# Patient Record
Sex: FEMALE | Race: WHITE
[De-identification: names, ages, dates, MRNs, and addresses within clinical notes are randomized per-mention and may not be internally consistent; named-entity substitution may affect disease eponyms.]

---

## 2019-12-02 ENCOUNTER — HOSPITAL ENCOUNTER (EMERGENCY)
Dept: HOSPITAL 61 - ER | Age: 84
Discharge: HOME | End: 2019-12-02
Payer: MEDICARE

## 2019-12-02 VITALS — SYSTOLIC BLOOD PRESSURE: 170 MMHG | DIASTOLIC BLOOD PRESSURE: 78 MMHG

## 2019-12-02 VITALS — BODY MASS INDEX: 24.8 KG/M2 | HEIGHT: 63 IN | WEIGHT: 140 LBS

## 2019-12-02 DIAGNOSIS — Z88.1: ICD-10-CM
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
## 2019-12-02 DIAGNOSIS — I10: ICD-10-CM

## 2019-12-02 DIAGNOSIS — Z88.0: ICD-10-CM

## 2019-12-02 DIAGNOSIS — F41.9: ICD-10-CM
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
## 2019-12-02 DIAGNOSIS — Z88.5: ICD-10-CM
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
## 2019-12-02 DIAGNOSIS — Z88.6: ICD-10-CM

## 2019-12-02 DIAGNOSIS — R51: ICD-10-CM

## 2019-12-02 DIAGNOSIS — Z71.1: ICD-10-CM
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
## 2019-12-02 DIAGNOSIS — Z86.73: ICD-10-CM

## 2019-12-02 DIAGNOSIS — Z88.8: ICD-10-CM

## 2019-12-02 DIAGNOSIS — S81.802A: Primary | ICD-10-CM

## 2019-12-02 DIAGNOSIS — S81.801A: ICD-10-CM

## 2019-12-02 DIAGNOSIS — Z88.2: ICD-10-CM

## 2019-12-02 LAB
ALBUMIN SERPL-MCNC: 3.9 G/DL (ref 3.4–5)
ALBUMIN/GLOB SERPL: 1.1 {RATIO} (ref 1–1.7)
ALP SERPL-CCNC: 144 U/L (ref 46–116)
ALT SERPL-CCNC: 20 U/L (ref 14–59)
ANION GAP SERPL CALC-SCNC: 10 MMOL/L (ref 6–14)
APTT PPP: YELLOW S
AST SERPL-CCNC: 14 U/L (ref 15–37)
BACTERIA #/AREA URNS HPF: 0 /HPF
BASOPHILS # BLD AUTO: 0 X10^3/UL (ref 0–0.2)
BASOPHILS NFR BLD: 1 % (ref 0–3)
BILIRUB SERPL-MCNC: 0.3 MG/DL (ref 0.2–1)
BILIRUB UR QL STRIP: NEGATIVE
BUN SERPL-MCNC: 20 MG/DL (ref 7–20)
BUN/CREAT SERPL: 29 (ref 6–20)
CALCIUM SERPL-MCNC: 9.8 MG/DL (ref 8.5–10.1)
CHLORIDE SERPL-SCNC: 104 MMOL/L (ref 98–107)
CK SERPL-CCNC: 85 U/L (ref 26–192)
CO2 SERPL-SCNC: 29 MMOL/L (ref 21–32)
CREAT SERPL-MCNC: 0.7 MG/DL (ref 0.6–1)
EOSINOPHIL NFR BLD: 0.2 X10^3/UL (ref 0–0.7)
EOSINOPHIL NFR BLD: 4 % (ref 0–3)
ERYTHROCYTE [DISTWIDTH] IN BLOOD BY AUTOMATED COUNT: 14.6 % (ref 11.5–14.5)
FIBRINOGEN PPP-MCNC: CLEAR MG/DL
GFR SERPLBLD BASED ON 1.73 SQ M-ARVRAT: 79.9 ML/MIN
GLOBULIN SER-MCNC: 3.6 G/DL (ref 2.2–3.8)
GLUCOSE SERPL-MCNC: 114 MG/DL (ref 70–99)
HCT VFR BLD CALC: 40.2 % (ref 36–47)
HGB BLD-MCNC: 13.5 G/DL (ref 12–15.5)
LYMPHOCYTES # BLD: 1 X10^3/UL (ref 1–4.8)
LYMPHOCYTES NFR BLD AUTO: 18 % (ref 24–48)
MAGNESIUM SERPL-MCNC: 2.3 MG/DL (ref 1.8–2.4)
MCH RBC QN AUTO: 30 PG (ref 25–35)
MCHC RBC AUTO-ENTMCNC: 34 G/DL (ref 31–37)
MCV RBC AUTO: 88 FL (ref 79–100)
MONO #: 0.2 X10^3/UL (ref 0–1.1)
MONOCYTES NFR BLD: 5 % (ref 0–9)
NEUT #: 3.9 X10^3/UL (ref 1.8–7.7)
NEUTROPHILS NFR BLD AUTO: 73 % (ref 31–73)
NITRITE UR QL STRIP: NEGATIVE
PH UR STRIP: 7 [PH]
PLATELET # BLD AUTO: 157 X10^3/UL (ref 140–400)
POTASSIUM SERPL-SCNC: 3.9 MMOL/L (ref 3.5–5.1)
PROT SERPL-MCNC: 7.5 G/DL (ref 6.4–8.2)
PROT UR STRIP-MCNC: NEGATIVE MG/DL
PROTHROMBIN TIME: 13.1 SEC (ref 11.7–14)
RBC # BLD AUTO: 4.56 X10^6/UL (ref 3.5–5.4)
RBC #/AREA URNS HPF: (no result) /HPF (ref 0–2)
SODIUM SERPL-SCNC: 143 MMOL/L (ref 136–145)
SQUAMOUS #/AREA URNS LPF: (no result) /LPF
UROBILINOGEN UR-MCNC: 0.2 MG/DL
WBC # BLD AUTO: 5.4 X10^3/UL (ref 4–11)
WBC #/AREA URNS HPF: (no result) /HPF (ref 0–4)

## 2019-12-02 PROCEDURE — 81001 URINALYSIS AUTO W/SCOPE: CPT

## 2019-12-02 PROCEDURE — 71045 X-RAY EXAM CHEST 1 VIEW: CPT

## 2019-12-02 PROCEDURE — 83735 ASSAY OF MAGNESIUM: CPT

## 2019-12-02 PROCEDURE — 70450 CT HEAD/BRAIN W/O DYE: CPT

## 2019-12-02 PROCEDURE — 84484 ASSAY OF TROPONIN QUANT: CPT

## 2019-12-02 PROCEDURE — 93005 ELECTROCARDIOGRAM TRACING: CPT

## 2019-12-02 PROCEDURE — 83880 ASSAY OF NATRIURETIC PEPTIDE: CPT

## 2019-12-02 PROCEDURE — 85610 PROTHROMBIN TIME: CPT

## 2019-12-02 PROCEDURE — 85025 COMPLETE CBC W/AUTO DIFF WBC: CPT

## 2019-12-02 PROCEDURE — 36415 COLL VENOUS BLD VENIPUNCTURE: CPT

## 2019-12-02 PROCEDURE — 82550 ASSAY OF CK (CPK): CPT

## 2019-12-02 PROCEDURE — 80053 COMPREHEN METABOLIC PANEL: CPT

## 2019-12-02 PROCEDURE — 83605 ASSAY OF LACTIC ACID: CPT

## 2019-12-02 NOTE — RAD
CT head without contrast dated 12/2/2019.

 

Comparison made to 9/14/2009.

 

Clinical data indication: Lower extremity weakness.

 

TECHNIQUE: Per contiguous axial imaging the head was performed from skull 

base to vertex. No contrast administered.

 

FINDINGS:

 

Ventricles and sulci are moderately prominent for age. No midline shift or

mass effect. Moderate patchy low density throughout the deep/subcortical 

periventricular white matter. No hemorrhage or extra-axial collection. 

Posterior fossa and brainstem unremarkable.

 

Mild mucosal thickening of the ethmoid air cells. The visualized paranasal

sinuses and mastoid air cells are otherwise clear. No apparent calvarial 

abnormality.

1. No evidence of acute intracranial hemorrhage or mass.

2. Mild/moderate chronic small vessel ischemic changes and atrophy, 

similar to slightly increased from prior exam.

 

Electronically signed by: Boston Thomas MD (12/2/2019 1:11 PM) 

[facility]

## 2019-12-02 NOTE — EKG
[facility]

              [address] [place] [address]

Test Date:    2019               Test Time:    14:14:26

Pat Name:     MONIQUE MARRERO                Department:   

Patient ID:   [id number]           Room:          

Gender:       F                        Technician:   

:          1935               Requested By: STEPHANIA MORIN

Order Number: [id number]           Reading MD:     

                                 Measurements

Intervals                              Axis          

Rate:         59                       P:            

CT:                                    QRS:          -152

QRSD:         82                       T:            174

QT:           416                                    

QTc:          416                                    

                           Interpretive Statements

ATRIAL FIBRILLATION

* POSSIBLE REVERSAL OF THE ARM LEADS

ABNORMAL RIGHT SUPERIOR AXIS DEVIATION

QRS(T) CONTOUR ABNORMALITY

CONSISTENT WITH ANTEROSEPTAL INFARCT

PROBABLY OLD

CONSISTENT WITH HIGH LATERAL INFARCT

AGE UNDETERMINED

CONSISTENT WITH INFERIOR INFARCT

AGE UNDETERMINED

ABNORMAL ECG

No previous ECG available for comparison

## 2019-12-02 NOTE — PHYS DOC
Past Medical History


Past Medical History:  CVA, Hypertension, Other


Additional Past Medical Histor:  STROKE IN 2009


Past Surgical History:  No Surgical History


Alcohol Use:  None


Drug Use:  None





Adult General


Chief Complaint


Chief Complaint:  WEAKNESS/GENERALIZED





HPI


HPI





Patient is a 84  year old female patient with history of hypertension, CVA in 

2009, chronic right lower extremity wound who presents with complaining of 

bilateral lower legs weakness and urinary frequency. Patient states she has had 

bilateral lower leg weakness since she tried to walk without paresthesia, 

headache, chest pain, fever and chills, blurred vision, shortness of breath. 

Patient states he had stroke in  and wanted to make sure she doesn't have 

another stroke. Patient also states she was prescribed doxycycline in  and 

fifth dry mouth and drink plenty of water and therefore is urinating frequently 

without dysuria, abdominal pain, nausea and vomiting, diarrhea and constipation 

and wants to make sure she doesn't have UTI.





Review of Systems


Review of Systems





Constitutional: Denies fever or chills []


Eyes: Denies change in visual acuity, redness, or eye pain []


HENT: Denies nasal congestion or sore throat []


Respiratory: Denies cough or shortness of breath []


Cardiovascular: No additional information not addressed in HPI []


GI: Denies abdominal pain, nausea, vomiting, bloody stools or diarrhea []


: Denies dysuria or hematuria []


Musculoskeletal: Denies back pain or joint pain []


Integument: Denies rash or skin lesions []


Neurologic: Denies headache, focal weakness or sensory changes []


Endocrine: Denies polyuria or polydipsia []





All other systems were reviewed and found to be within normal limits, except as 

documented in this note.





Allergies


Allergies





Allergies








Coded Allergies Type Severity Reaction Last Updated Verified


 


  acetaminophen Allergy Intermediate Unknown 7/22/15 No


 


  atropine Allergy Intermediate  7/25/15 No


 


  cephalexin Allergy Intermediate  7/25/15 No


 


  clarithromycin Allergy Intermediate  7/25/15 No


 


  diphenoxylate Allergy Intermediate  7/25/15 No


 


  indomethacin Allergy Intermediate  7/25/15 No


 


  methylprednisolone Allergy Intermediate  7/25/15 No


 


  penicillin Allergy Intermediate  7/25/15 No


 


  perfume Allergy Intermediate  7/25/15 No


 


  sulfamethoxazole Allergy Intermediate  7/25/15 No


 


  trimethoprim Allergy Intermediate  7/25/15 No


 


  ciprofloxacin Adverse Reaction Intermediate GI Upset 7/24/15 No


 


  hydrocodone Adverse Reaction Intermediate  7/25/15 No











Physical Exam


Physical Exam





Constitutional: Well developed, well nourished, mild distress, non-toxic 

appearance. []


HENT: Normocephalic, atraumatic, bilateral external ears normal, oropharynx dry 

, no oral exudates, nose normal. []


Eyes: PERRLA, EOMI, conjunctiva normal, no discharge. [] 


Neck: Normal range of motion, no tenderness, supple, no stridor. [] 


Cardiovascular:Heart rate regular rhythm, no murmur []


Lungs & Thorax:  Bilateral breath sounds clear to auscultation []


Abdomen: Bowel sounds normal, soft, no tenderness, no masses, no pulsatile 

masses. [] 


Skin: Warm, dry, no erythema, no rash. [] 


Back: No tenderness, no CVA tenderness. [] 


Extremities: No tenderness, chronic left bilateral ankles wound, no cyanosis, no

 clubbing, ROM intact, no edema. [] 


Neurologic: Alert and oriented X 3, normal motor function, normal sensory 

function, no focal deficits noted, NIH scale of 0. []


Psychologic: Affect normal, judgement normal, mood normal. []





Current Patient Data


Vital Signs





                                   Vital Signs








  Date Time  Temp Pulse Resp B/P (MAP) Pulse Ox O2 Delivery O2 Flow Rate FiO2


 


19 12:30 98.3 64 22 170/78 (108) 97 Room Air  





 98.3       








Lab Values





                                Laboratory Tests








Test


 19


12:00 19


13:31


 


Urine Collection Type Unknown   


 


Urine Color Yellow   


 


Urine Clarity Clear   


 


Urine pH 7.0   


 


Urine Specific Gravity 1.010   


 


Urine Protein


 Negative mg/dL


(NEG-TRACE) 





 


Urine Glucose (UA)


 Negative mg/dL


(NEG) 





 


Urine Ketones (Stick)


 Negative mg/dL


(NEG) 





 


Urine Blood


 Negative (NEG)


 





 


Urine Nitrite


 Negative (NEG)


 





 


Urine Bilirubin


 Negative (NEG)


 





 


Urine Urobilinogen Dipstick


 0.2 mg/dL (0.2


mg/dL) 





 


Urine Leukocyte Esterase


 Negative (NEG)


 





 


Urine RBC


 Occ /HPF (0-2)


 





 


Urine WBC


 Rare /HPF


(0-4) 





 


Urine Squamous Epithelial


Cells Occ /LPF  


 





 


Urine Bacteria


 0 /HPF (0-FEW)


 





 


White Blood Count


 


 5.4 x10^3/uL


(4.0-11.0)


 


Red Blood Count


 


 4.56 x10^6/uL


(3.50-5.40)


 


Hemoglobin


 


 13.5 g/dL


(12.0-15.5)


 


Hematocrit


 


 40.2 %


(36.0-47.0)


 


Mean Corpuscular Volume


 


 88 fL ()





 


Mean Corpuscular Hemoglobin  30 pg (25-35)  


 


Mean Corpuscular Hemoglobin


Concent 


 34 g/dL


(31-37)


 


Red Cell Distribution Width


 


 14.6 %


(11.5-14.5)  H


 


Platelet Count


 


 157 x10^3/uL


(140-400)


 


Neutrophils (%) (Auto)  73 % (31-73)  


 


Lymphocytes (%) (Auto)  18 % (24-48)  L


 


Monocytes (%) (Auto)  5 % (0-9)  


 


Eosinophils (%) (Auto)  4 % (0-3)  H


 


Basophils (%) (Auto)  1 % (0-3)  


 


Neutrophils # (Auto)


 


 3.9 x10^3/uL


(1.8-7.7)


 


Lymphocytes # (Auto)


 


 1.0 x10^3/uL


(1.0-4.8)


 


Monocytes # (Auto)


 


 0.2 x10^3/uL


(0.0-1.1)


 


Eosinophils # (Auto)


 


 0.2 x10^3/uL


(0.0-0.7)


 


Basophils # (Auto)


 


 0.0 x10^3/uL


(0.0-0.2)


 


Sodium Level


 


 143 mmol/L


(136-145)


 


Potassium Level


 


 3.9 mmol/L


(3.5-5.1)


 


Chloride Level


 


 104 mmol/L


()


 


Carbon Dioxide Level


 


 29 mmol/L


(21-32)


 


Anion Gap  10 (6-14)  


 


Blood Urea Nitrogen


 


 20 mg/dL


(7-20)


 


Creatinine


 


 0.7 mg/dL


(0.6-1.0)


 


Estimated GFR


(Cockcroft-Gault) 


 79.9  





 


BUN/Creatinine Ratio  29 (6-20)  H


 


Glucose Level


 


 114 mg/dL


(70-99)  H


 


Lactic Acid Level


 


 0.6 mmol/L


(0.4-2.0)


 


Calcium Level


 


 9.8 mg/dL


(8.5-10.1)


 


Magnesium Level


 


 2.3 mg/dL


(1.8-2.4)


 


Total Bilirubin


 


 0.3 mg/dL


(0.2-1.0)


 


Aspartate Amino Transferase


(AST) 


 14 U/L (15-37)


L


 


Alanine Aminotransferase (ALT)


 


 20 U/L (14-59)





 


Alkaline Phosphatase


 


 144 U/L


()  H


 


Creatine Kinase


 


 85 U/L


()


 


Troponin I Quantitative


 


 < 0.017 ng/mL


(0.000-0.055)


 


NT-Pro-B-Type Natriuretic


Peptide 


 307 pg/mL


(0-449)


 


Total Protein


 


 7.5 g/dL


(6.4-8.2)


 


Albumin


 


 3.9 g/dL


(3.4-5.0)


 


Albumin/Globulin Ratio  1.1 (1.0-1.7)  





                                Laboratory Tests


19 13:31








                                Laboratory Tests


19 13:31











EKG


EKG


EKG interpreted by me. EKG at 1414 showed  sinus rhythm with multiple artifact 

at rate of 60, poor R-wave progress in anteroseptal leads, no acute ST and T-

wave elevation.





Radiology/Procedures


Radiology/Procedures


                            [address] [address]


                                 [phone]


                                        


                                 IMAGING REPORT





                                     Signed





PATIENT: MONIQUE MARRERO      ACCOUNT: [id number]     MRN#: [id number]


: 1935           LOCATION: ER              AGE: 84


SEX: F                    EXAM DT: 19         ACCESSION#: [id number]


STATUS: REG ER            ORD. PHYSICIAN: STEPHANIA MORIN MD


REASON: bilateral lower extremity weakness


PROCEDURE: PORTABLE CHEST 1V





PORTABLE CHEST 1V


 


Clinical indications: Bilateral lower extremity weakness. 


 


COMPARISON: 2015.


 


Findings: No acute lung infiltrate or pleural effusion or pulmonary edema 


or lung mass or pneumothorax is seen.  The heart size, pulmonary 


vasculature, mediastinum and both loulou are stable. Old healed left rib 


cage fractures are seen.


 


Impression: No acute radiographic abnormality is seen.


 


Electronically signed by: Solitario Campbell MD (2019 12:53 PM) [facility]














DICTATED and SIGNED BY:     SOLITARIO CAMPBELL MD


DATE:     19 [address] [place] [id number] [phone]


                                        


                                 IMAGING REPORT





                                     Signed





PATIENT: MONIQUE MARRERO      ACCOUNT: [id number]     MRN#: [id number]


: 1935           LOCATION: ER              AGE: 84


SEX: F                    EXAM DT: 19         ACCESSION#: [id number]


STATUS: REG ER            ORD. PHYSICIAN: STEPHANIA MORIN MD


REASON: bilateral lower extremity weakness


PROCEDURE: CT HEAD WO CONTRAST





CT head without contrast dated 2019.


 


Comparison made to 2009.


 


Clinical data indication: Lower extremity weakness.


 


TECHNIQUE: Per contiguous axial imaging the head was performed from skull 


base to vertex. No contrast administered.


 


FINDINGS:


 


Ventricles and sulci are moderately prominent for age. No midline shift or


mass effect. Moderate patchy low density throughout the deep/subcortical 


periventricular white matter. No hemorrhage or extra-axial collection. 


Posterior fossa and brainstem unremarkable.


 


Mild mucosal thickening of the ethmoid air cells. The visualized paranasal


sinuses and mastoid air cells are otherwise clear. No apparent calvarial 


abnormality.


1. No evidence of acute intracranial hemorrhage or mass.


2. Mild/moderate chronic small vessel ischemic changes and atrophy, 


similar to slightly increased from prior exam.


 


Electronically signed by: Boston Thomas MD (2019 1:11 PM) 


[facility]














DICTATED and SIGNED BY:     BOSTON THOMAS MD


DATE:     19 1311





Course & Med Decision Making


Course & Med Decision Making


Pertinent Labs and Imaging studies reviewed. (See chart for details)





Evaluation of patient in ER showed 84-year-old female patient with complaining 

of bilateral lower extremity weakness and urinary frequency. Patient had NIHS of

  0 with unremarkable labs, UA and CT head. Lactic acid was negative. Plan 

discharge patient home with diagnose of anxiety about health.





Dragon Disclaimer


Dragon Disclaimer


This electronic medical record was generated, in whole or in part, using a voice

 recognition dictation system.





Departure


Departure


Impression:  


   Primary Impression:  


   Anxiety about health


   Additional Impressions:  


   Feared condition not demonstrated


   Chronic wound of extremity


Disposition:  01 HOME, SELF-CARE (at 1431)


Condition:  IMPROVED


Referrals:  


RADHA BLANDON MD (PCP)


Patient Instructions:  Anxiety and Panic Attacks





Additional Instructions:  


Continue current medication


Follow-up with your primary care physician in 3-5 days


Return to ER if not getting better





NIHSS Stroke Scale


NIH Stroke Scale:  








NIH Stroke Scale Response (Comments) Value


 


Level of Consciousness:                 0 Alert/Responsive 0


 


LOC Questions:                          0 Answers both correctly 0


 


LOC Commands:                           0 Performs both tasks 0


 


Best Gaze:                              0 Normal 0


 


Visual:                                 0 No visual loss 0


 


Facial Palsy:                           0 Normal, symmetrical 0


 


Motor - Left Arm                        0 No drift 0


 


Motor - Right Arm                       0 No drift 0


 


Motor - Left Leg                        0 No drift 0


 


Motor: Right Leg                        0 No drift 0


 


Limb Ataxia:                            0 Absent 0


 


Sensory:                                0 No loss 0


 


Best Language:                          0 Normal 0


 


Dysathria:                              0 Normal 0


 


Extinction and Inattention:             0 Normal 0


 


Total  0











Problem Qualifiers











STEPHANIA MORIN MD              Dec 2, 2019 12:39

## 2019-12-02 NOTE — RAD
PORTABLE CHEST 1V

 

Clinical indications: Bilateral lower extremity weakness. 

 

COMPARISON: July 22, 2015.

 

Findings: No acute lung infiltrate or pleural effusion or pulmonary edema 

or lung mass or pneumothorax is seen.  The heart size, pulmonary 

vasculature, mediastinum and both loulou are stable. Old healed left rib 

cage fractures are seen.

 

Impression: No acute radiographic abnormality is seen.

 

Electronically signed by: Marin Campbell MD (12/2/2019 12:53 PM) [facility]

## 2020-05-20 VITALS — SYSTOLIC BLOOD PRESSURE: 122 MMHG | DIASTOLIC BLOOD PRESSURE: 58 MMHG

## 2021-11-01 ENCOUNTER — HOSPITAL ENCOUNTER (OUTPATIENT)
Dept: HOSPITAL 61 - RAD | Age: 86
End: 2021-11-01
Attending: FAMILY MEDICINE
Payer: MEDICARE

## 2021-11-01 DIAGNOSIS — Z96.642: ICD-10-CM

## 2021-11-01 DIAGNOSIS — M46.1: Primary | ICD-10-CM

## 2021-11-01 PROCEDURE — 73502 X-RAY EXAM HIP UNI 2-3 VIEWS: CPT

## 2021-11-01 NOTE — RAD
EXAM:  XR BILATERAL HIP (WITH OR WITHOUT PELVIS) LEFT 2 VIEWS 11/1/2021 10:45 AM



CLINICAL INDICATION:  Hip pain



COMPARISON:  Left hip radiograph 1/28/2016



TECHNIQUE:  AP view of the pelvis and AP and frog-leg lateral view of the left hip



FINDINGS:  There is a left hip prosthesis. No periprosthetic fracture or lucency. The bones appear de
mineralized. The right hip joint space is maintained. 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
oiliac joints. The pubic symphysis is maintained. Hyperdense material in the pelvis could be either r
etained barium in the bowel or a large partially calcified fibroid.



IMPRESSION:  

1. Unchanged, uncomplicated left hip prosthesis.

2. Decreased bone mineralization.

3. Hyperdense material the pelvis could be retained contrast within the bowel from a prior exam or a 
large partially calcified fibroid.



Electronically signed by: Cee Jesus MD (11/1/2021 1:52 PM) [id number]